# Patient Record
(demographics unavailable — no encounter records)

---

## 2025-04-10 NOTE — HISTORY OF PRESENT ILLNESS
[FreeTextEntry1] : establish care [de-identified] : 45F presents to establish care. R ear discomfort has been occurring for 1 month pt previously treated with augmentin then a course of ciprofloxacin with improvement in sxms. pt uses q-tips regularly

## 2025-04-10 NOTE — PHYSICAL EXAM
[Normal] : no joint swelling and grossly normal strength and tone [de-identified] :  otoscope L ear WNL, R ear erythematous cerum build up

## 2025-04-10 NOTE — ASSESSMENT
[Vaccines Reviewed] : Immunizations reviewed today. Please see immunization details in the vaccine log within the immunization flowsheet.  [FreeTextEntry1] : Ear pain - otoscope L ear WNL, R ear erythematous cerum build up - previously treated with cipro and Augmentin  Lymphadenopathy - improved with time previously seen  Educated about importance of healthy diet, physical activity (30 min 5 days a week moderate intensity exercises), proper sleep (8 hours of sleep), importance of screening test for early detection and treatment of cancer. Importance of immunizations including COVID-19 and seasonal flu vaccine in order to prevent or lessen disease. Mammo 03/2025 - WNL Pap Smear - UTD - WNL Cscope - referral given

## 2025-04-10 NOTE — HEALTH RISK ASSESSMENT
[PHQ-2 Negative - No further assessment needed] : PHQ-2 Negative - No further assessment needed [Hepatitis C test offered] : Hepatitis C test offered [Good] : ~his/her~  mood as  good [Yes] : Yes [Monthly or less (1 pt)] : Monthly or less (1 point) [1 or 2 (0 pts)] : 1 or 2 (0 points) [Never (0 pts)] : Never (0 points) [No] : In the past 12 months have you used drugs other than those required for medical reasons? No [No falls in past year] : Patient reported no falls in the past year [Never] : Never [HIV Test offered] : HIV Test offered [With Family] : lives with family [Employed] : employed [] :  [Sexually Active] : sexually active [Feels Safe at Home] : Feels safe at home [Fully functional (bathing, dressing, toileting, transferring, walking, feeding)] : Fully functional (bathing, dressing, toileting, transferring, walking, feeding) [Fully functional (using the telephone, shopping, preparing meals, housekeeping, doing laundry, using] : Fully functional and needs no help or supervision to perform IADLs (using the telephone, shopping, preparing meals, housekeeping, doing laundry, using transportation, managing medications and managing finances) [Reports normal functional visual acuity (ie: able to read med bottle)] : Reports normal functional visual acuity [Smoke Detector] : smoke detector [Carbon Monoxide Detector] : carbon monoxide detector [Seat Belt] :  uses seat belt [Sunscreen] : uses sunscreen [de-identified] : no opiods [de-identified] : active job [de-identified] : balanced [Change in mental status noted] : No change in mental status noted [Language] : denies difficulty with language [Behavior] : denies difficulty with behavior [Learning/Retaining New Information] : denies difficulty learning/retaining new information [Handling Complex Tasks] : denies difficulty handling complex tasks [Reasoning] : denies difficulty with reasoning [Spatial Ability and Orientation] : denies difficulty with spatial ability and orientation [High Risk Behavior] : no high risk behavior [Reports changes in hearing] : Reports no changes in hearing [Reports changes in vision] : Reports no changes in vision [Reports changes in dental health] : Reports no changes in dental health [de-identified] : Language  [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

## 2025-04-10 NOTE — PHYSICAL EXAM
[Normal] : no joint swelling and grossly normal strength and tone [de-identified] :  otoscope L ear WNL, R ear erythematous cerum build up

## 2025-04-10 NOTE — HEALTH RISK ASSESSMENT
[PHQ-2 Negative - No further assessment needed] : PHQ-2 Negative - No further assessment needed [Hepatitis C test offered] : Hepatitis C test offered [Good] : ~his/her~  mood as  good [Yes] : Yes [Monthly or less (1 pt)] : Monthly or less (1 point) [1 or 2 (0 pts)] : 1 or 2 (0 points) [Never (0 pts)] : Never (0 points) [No] : In the past 12 months have you used drugs other than those required for medical reasons? No [No falls in past year] : Patient reported no falls in the past year [Never] : Never [HIV Test offered] : HIV Test offered [With Family] : lives with family [Employed] : employed [] :  [Sexually Active] : sexually active [Feels Safe at Home] : Feels safe at home [Fully functional (bathing, dressing, toileting, transferring, walking, feeding)] : Fully functional (bathing, dressing, toileting, transferring, walking, feeding) [Fully functional (using the telephone, shopping, preparing meals, housekeeping, doing laundry, using] : Fully functional and needs no help or supervision to perform IADLs (using the telephone, shopping, preparing meals, housekeeping, doing laundry, using transportation, managing medications and managing finances) [Reports normal functional visual acuity (ie: able to read med bottle)] : Reports normal functional visual acuity [Smoke Detector] : smoke detector [Carbon Monoxide Detector] : carbon monoxide detector [Seat Belt] :  uses seat belt [Sunscreen] : uses sunscreen [de-identified] : no opiods [de-identified] : active job [de-identified] : balanced [Change in mental status noted] : No change in mental status noted [Language] : denies difficulty with language [Behavior] : denies difficulty with behavior [Learning/Retaining New Information] : denies difficulty learning/retaining new information [Handling Complex Tasks] : denies difficulty handling complex tasks [Reasoning] : denies difficulty with reasoning [Spatial Ability and Orientation] : denies difficulty with spatial ability and orientation [High Risk Behavior] : no high risk behavior [Reports changes in hearing] : Reports no changes in hearing [Reports changes in vision] : Reports no changes in vision [Reports changes in dental health] : Reports no changes in dental health [de-identified] : Language  [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

## 2025-05-09 NOTE — PROCEDURE
[FreeTextEntry3] : After informed verbal consent is obtained, cerumen impaction is removed from the right ear canal with a curette & suction. Pt tolerated well, no residual ear canal irritation.

## 2025-05-09 NOTE — PHYSICAL EXAM
[Normal] : tympanic membranes are normal in both ears [de-identified] : R cerumen impaction removed / eczematous changes b/l EAC [de-identified] : TM clear au

## 2025-05-09 NOTE — REASON FOR VISIT
[Initial Consultation] : an initial consultation for [Other: ______] : provided by KELLY [FreeTextEntry2] : otalgia [Interpreters_IDNumber] : [id number] [Interpreters_FullName] : Marcie [TWNoteComboBox1] : Ethiopian

## 2025-05-09 NOTE — ASSESSMENT
[FreeTextEntry1] : R otalgia / itching: cerumen removed R side today, she does have eczema b/l ear canals. 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

## 2025-05-09 NOTE — HISTORY OF PRESENT ILLNESS
[de-identified] : Ms. CURTIS is a 45 year female referred by PCP for ear eval, she was seen for annual exam and referred for inflammation in the ear. R ear has been itchy and occasional pain, L ear reports decreased hearing and tinnitus which comes and goes for 1 day she purchased Neomycin polymyxin drops, used for 3 weeks and pain much improved - denies otorrhea, tinnitus, vertigo, hearing loss  - she recalls having to be seen in the ED years ago for removal of a qtip from the R ear

## 2025-05-09 NOTE — END OF VISIT
[FreeTextEntry3] : I personally saw and examined MIKAL CURTIS in detail. I spoke to KARRIE Alvarado regarding the assessment and plan of care. I performed the procedures and relevant physical exam. I have made changes to the body of the note wherever necessary and appropriate.

## 2025-05-09 NOTE — CONSULT LETTER
[Dear  ___] : Dear  [unfilled], [Consult Letter:] : I had the pleasure of evaluating your patient, [unfilled]. [Consult Closing:] : Thank you very much for allowing me to participate in the care of this patient.  If you have any questions, please do not hesitate to contact me. [Please see my note below.] : Please see my note below. [FreeTextEntry3] : Leilani Sam MD Otolaryngology- Facial Plastics  [address] [place] [id number] (P) - [phone] (F) - [phone]

## 2025-05-23 NOTE — END OF VISIT
[FreeTextEntry3] : I personally saw and examined Ms. MIKAL CURTIS in detail this visit today. I personally reviewed the HPI, PMH, FH, SH, ROS and medications/allergies. I have spoken to GONSALO Ferro regarding the history and have personally determined the assessment and plan of care, and documented this myself. I was present and participated in all key portions of the encounter and all procedures noted above. I have made changes in the body of the note where appropriate.   Attesting Faculty: See Attending Signature Below

## 2025-05-23 NOTE — HISTORY OF PRESENT ILLNESS
[de-identified] : Ms. CURTIS is a 45 year female referred by PCP for ear eval, she was seen for annual exam and referred for inflammation in the ear. R ear has been itchy and occasional pain, L ear reports decreased hearing and tinnitus which comes and goes for 1 day she purchased Neomycin polymyxin drops, used for 3 weeks and pain much improved - denies otorrhea, tinnitus, vertigo, hearing loss  - she recalls having to be seen in the ED years ago for removal of a qtip from the R ear [FreeTextEntry1] : She is here for f/u for her sudden hearing loss. She has noticed her hearing has improved. She is still currently on a steroid taper. She states the prednisone is hurting her stomach and she started taking omeprazole daily for this.

## 2025-05-23 NOTE — REASON FOR VISIT
[Subsequent Evaluation] : a subsequent evaluation for [Other: ______] : provided by KELLY [FreeTextEntry2] : otalgia [Interpreters_IDNumber] : [id number] [Interpreters_FullName] : Marcie [TWNoteComboBox1] : Iranian

## 2025-05-23 NOTE — ASSESSMENT
[FreeTextEntry1] : R otalgia / itching: her ear pain has resolved Ear itching - continue Mometasone drops as needed   L hearing loss / tinnitus x 1 day - Audio today shows her hearing has returned to normal -Can stop the prednisone taper as it is hurting her stomach and her hearing has improved  - cont omeprazole daily until stomach discomfort resolves; if persists rec f/u with GI -F/u with Dr. Sam in 1 month to recheck

## 2025-07-14 NOTE — ASSESSMENT
[FreeTextEntry1] : 44 y/o female comes in post er visit for dysuria, pain with intercourse, pain with her period for the past month.  #dysuria  - p/w s/p er visit for dysuria, Pain with periods and sexual intercourse for one month - no signs of infection as UA and UCx all neg  - p/w with abnormal swelling around urethra as per ER - referred to urology   #Ear pain/ hearing loss  - Reviewed note from  JAY - was given Mometasone drops - symptoms resolved  - Hearing test normal as JAY

## 2025-07-14 NOTE — END OF VISIT
[] : Resident [FreeTextEntry3] : I, Dr. Cachorro Ibarra, saw and evaluated this patient in the presence of the resident. I discussed the management with the resident. I reviewed the note and agree with the documented plan of care with the following confirmations/corrections/additions: None.

## 2025-07-14 NOTE — ASSESSMENT
[FreeTextEntry1] : 46 y/o female comes in post er visit for dysuria, pain with intercourse, pain with her period for the past month.  #dysuria  - p/w s/p er visit for dysuria, Pain with periods and sexual intercourse for one month - no signs of infection as UA and UCx all neg  - p/w with abnormal swelling around urethra as per ER - referred to urology   #Ear pain/ hearing loss  - Reviewed note from  JAY - was given Mometasone drops - symptoms resolved  - Hearing test normal as JAY

## 2025-07-14 NOTE — HISTORY OF PRESENT ILLNESS
[FreeTextEntry1] : established care [de-identified] : 44 y/o female comes in post er visit for dysuria, pain with intercourse, pain with her period for the past month. Reports feels a lump near her urethra. Labs Iin Er were negative for any infection.  was advised from the ED to go to pcp for obgyn vs urology referral